# Patient Record
Sex: FEMALE | Race: BLACK OR AFRICAN AMERICAN | URBAN - METROPOLITAN AREA
[De-identification: names, ages, dates, MRNs, and addresses within clinical notes are randomized per-mention and may not be internally consistent; named-entity substitution may affect disease eponyms.]

---

## 2024-02-18 NOTE — PROGRESS NOTES
HISTORY OF PRESENT ILLNESS  Blanquita Hughes is a 34 y.o. female.    PMH  ----  CARDIAC STUDIES  ----  2d echo 5/8/2017  NORMAL LEFT VENTRICULAR CAVITY SIZE. MILD LEFT VENTRICULAR HYPERTROPHY PRESENT.    NORMAL GLOBAL LEFT VENTRICULAR SYSTOLIC FUNCTION. NORMAL DIASTOLIC FUNCTION.    ESTIMATED EJECTION FRACTION OF 55%    NORMAL RIGHT VENTRICULAR SIZE. NORMAL RIGHT VENTRICULAR GLOBAL SYSTOLIC FUNCTION.    INSUFFICIENT TR TO ESTIMATE PULMONARY ARTERY PRESSURE.    NO PRIOR STUDY FOR COMPARISON.   ----  CTA PE chest 5/8/2017  1.  No pulmonary embolism. Prominence of the peripheral pulmonary vasculature without central artery dilatation could indicate fluid overload, pulmonary hypertension not entirely excluded.   2. When compared to prior chest radiographs, pulmonary airspace disease is much improved. Mild bronchitis and groundglass opacities remain nonspecific with differential consideration including infectious etiology versus edema.   -If clinically warranted, echocardiogram maybe helpful to evaluate for cardiogenic versus noncardiogenic etiologies.   3. Trace right pleural effusion and right upper quadrant free fluid, likely related/reactive to #2.   4. Right hilar and bilateral axillary adenopathy is likely reactive. Attention on clinical followup exams are recommended.   ----    Have you had Fatigue?  Yes if so how long 3 months how bad moderate     2.   Have you had have you had Chest Pain? Yes if so how long on/of for last 2 week how frequent mild . Tightness with exertion,      3.   Have you had Dyspnea (SOB) ? Yes if so how long 3 months  can you walk 2 blocks or a flight of stairs without SOB Yes     4.   Have you had Orthopnea? No      5.   Have you had PND? No      6.   Have you had leg swelling? Yes if so how long 3 months how frequent 3 months how      bad mild     7.    Have you had any weight gain? Yes if so how long 30 days how bad moderate 20 lbs     8. Have you had any palpitations? No      9. Have

## 2024-02-21 ENCOUNTER — OFFICE VISIT (OUTPATIENT)
Age: 34
End: 2024-02-21
Payer: COMMERCIAL

## 2024-02-21 VITALS
HEIGHT: 65 IN | SYSTOLIC BLOOD PRESSURE: 124 MMHG | WEIGHT: 191 LBS | DIASTOLIC BLOOD PRESSURE: 84 MMHG | BODY MASS INDEX: 31.82 KG/M2 | HEART RATE: 91 BPM

## 2024-02-21 DIAGNOSIS — R60.9 EDEMA, UNSPECIFIED TYPE: ICD-10-CM

## 2024-02-21 DIAGNOSIS — R07.9 CHEST PAIN, UNSPECIFIED TYPE: Primary | ICD-10-CM

## 2024-02-21 DIAGNOSIS — R06.09 DYSPNEA ON EXERTION: ICD-10-CM

## 2024-02-21 PROCEDURE — 99204 OFFICE O/P NEW MOD 45 MIN: CPT | Performed by: INTERNAL MEDICINE

## 2024-02-21 RX ORDER — POTASSIUM CHLORIDE 750 MG/1
10 TABLET, FILM COATED, EXTENDED RELEASE ORAL DAILY
COMMUNITY
Start: 2024-01-21

## 2024-02-21 RX ORDER — ERGOCALCIFEROL 1.25 MG/1
50000 CAPSULE ORAL WEEKLY
COMMUNITY
Start: 2024-02-20

## 2024-02-21 RX ORDER — POLYETHYLENE GLYCOL 3350 17 G/17G
17 POWDER, FOR SOLUTION ORAL DAILY
COMMUNITY
Start: 2023-12-13

## 2024-02-21 RX ORDER — VITAMIN B COMPLEX
2500 TABLET ORAL DAILY
COMMUNITY
Start: 2024-01-18

## 2024-02-21 ASSESSMENT — ENCOUNTER SYMPTOMS
COUGH: 0
BLOOD IN STOOL: 0
CONSTIPATION: 0
WHEEZING: 0
NAUSEA: 0
COLOR CHANGE: 0
SHORTNESS OF BREATH: 1
APNEA: 0
DIARRHEA: 0
ABDOMINAL PAIN: 0
CHEST TIGHTNESS: 1

## 2024-02-21 ASSESSMENT — PATIENT HEALTH QUESTIONNAIRE - PHQ9
SUM OF ALL RESPONSES TO PHQ QUESTIONS 1-9: 0
SUM OF ALL RESPONSES TO PHQ9 QUESTIONS 1 & 2: 0
SUM OF ALL RESPONSES TO PHQ QUESTIONS 1-9: 0
2. FEELING DOWN, DEPRESSED OR HOPELESS: 0
SUM OF ALL RESPONSES TO PHQ QUESTIONS 1-9: 0
SUM OF ALL RESPONSES TO PHQ QUESTIONS 1-9: 0
1. LITTLE INTEREST OR PLEASURE IN DOING THINGS: 0

## 2024-02-21 NOTE — PROGRESS NOTES
Have you had Fatigue?  Yes if so how long 3 months how bad moderate    2.   Have you had have you had Chest Pain? Yes if so how long on/of for last 2 week how frequent mild . Is     3.   Have you had Dyspnea (SOB) ? Yes if so how long 3 months  can you walk 2 blocks or a flight of stairs without SOB Yes    4.   Have you had Orthopnea? No     5.   Have you had PND? No     6.   Have you had leg swelling? Yes if so how long 3 months how frequent 3 months how     bad mild    7.    Have you had any weight gain? Yes if so how long 30 days how bad moderate 20 lbs    8. Have you had any palpitations? No      9. Have you had any syncope? No     10. Do you have any wounds on legs?No

## 2024-02-21 NOTE — PATIENT INSTRUCTIONS
Learning About the Mediterranean Diet  What is the Mediterranean diet?     The Mediterranean diet is a style of eating rather than a diet plan. It features foods eaten in [place], [place], [place], and other countries along the [place]. It emphasizes eating foods like fish, fruits, vegetables, beans, high-fiber breads and whole grains, nuts, and olive oil. This style of eating includes limited red meat, cheese, and sweets.  Why choose the Mediterranean diet?  A Mediterranean-style diet may improve heart health. It contains more fat than other heart-healthy diets. But the fats are mainly from nuts, unsaturated oils (such as fish oils and olive oil), and certain nut or seed oils (such as canola, soybean, or flaxseed oil). These fats may help protect the heart and blood vessels.  How can you get started on the Mediterranean diet?  Here are some things you can do to switch to a more Mediterranean way of eating.  What to eat  Eat a variety of fruits and vegetables each day, such as grapes, blueberries, tomatoes, broccoli, peppers, figs, olives, spinach, eggplant, beans, lentils, and chickpeas.  Eat a variety of whole-grain foods each day, such as oats, brown rice, and whole wheat bread, pasta, and couscous.  Eat fish at least 2 times a week. Try tuna, salmon, mackerel, lake trout, herring, or sardines.  Eat moderate amounts of low-fat dairy products, such as milk, cheese, or yogurt.  Eat moderate amounts of poultry and eggs.  Choose healthy (unsaturated) fats, such as nuts, olive oil, and certain nut or seed oils like canola, soybean, and flaxseed.  Limit unhealthy (saturated) fats, such as butter, palm oil, and coconut oil. And limit fats found in animal products, such as meat and dairy products made with whole milk. Try to eat red meat only a few times a month in very small amounts.  Limit sweets and desserts to only a few times a week. This includes sugar-sweetened drinks like soda.  The

## 2024-02-22 LAB — D-DIMER(G): <0.19 MG/L FEU (ref 0–1.12)

## 2024-04-30 ENCOUNTER — TELEPHONE (OUTPATIENT)
Age: 34
End: 2024-04-30

## 2024-04-30 NOTE — TELEPHONE ENCOUNTER
----- Message from Gala Vargas sent at 4/30/2024 10:21 AM EDT -----  Regarding: PAT DENIAL  Contact: [phone]  Pat called stating that the auth for echo was denied, will be sending out reason for denial. Case# [id number], [phone].

## 2024-04-30 NOTE — TELEPHONE ENCOUNTER
----- Message from Gala Vargas sent at 4/30/2024 10:21 AM EDT -----  Regarding: PAT DENIAL  Contact: [phone]  Pat called stating that the auth for echo was denied, will be sending out reason for denial. Case# [id number], [phone].

## 2024-05-09 ENCOUNTER — TELEPHONE (OUTPATIENT)
Age: 34
End: 2024-05-09

## 2024-05-09 NOTE — TELEPHONE ENCOUNTER
Called [facility] today regarding echo auth. First echo auth had  and the 2nd request was but in an denied because of the first auth. Spoke with [facility] and they have canceled the first auth since patient never had it done and they approved the 2nd auth.    Called and left message for patient to call the office to reschedule her echo and regular stress test.

## 2024-05-09 NOTE — TELEPHONE ENCOUNTER
Called patient and left message on her voicemail to inform her that her echo has been approved and that she can call the office to reschedule her echo and regular stress test.